# Patient Record
Sex: MALE | Employment: UNEMPLOYED | URBAN - METROPOLITAN AREA
[De-identification: names, ages, dates, MRNs, and addresses within clinical notes are randomized per-mention and may not be internally consistent; named-entity substitution may affect disease eponyms.]

---

## 2018-03-08 ENCOUNTER — HOSPITAL ENCOUNTER (EMERGENCY)
Facility: MEDICAL CENTER | Age: 43
End: 2018-03-08
Attending: EMERGENCY MEDICINE
Payer: COMMERCIAL

## 2018-03-08 VITALS
OXYGEN SATURATION: 98 % | TEMPERATURE: 98.4 F | HEART RATE: 84 BPM | DIASTOLIC BLOOD PRESSURE: 61 MMHG | BODY MASS INDEX: 27.49 KG/M2 | WEIGHT: 180.78 LBS | RESPIRATION RATE: 16 BRPM | SYSTOLIC BLOOD PRESSURE: 108 MMHG

## 2018-03-08 DIAGNOSIS — F22 PARANOID (HCC): ICD-10-CM

## 2018-03-08 DIAGNOSIS — F15.10 METHAMPHETAMINE ABUSE (HCC): ICD-10-CM

## 2018-03-08 DIAGNOSIS — F41.9 ANXIETY: ICD-10-CM

## 2018-03-08 LAB
AMPHET UR QL SCN: POSITIVE
BARBITURATES UR QL SCN: NEGATIVE
BENZODIAZ UR QL SCN: NEGATIVE
BZE UR QL SCN: NEGATIVE
CANNABINOIDS UR QL SCN: POSITIVE
METHADONE UR QL SCN: NEGATIVE
OPIATES UR QL SCN: NEGATIVE
OXYCODONE UR QL SCN: NEGATIVE
PCP UR QL SCN: NEGATIVE
POC BREATHALIZER: 0 PERCENT (ref 0–0.01)
PROPOXYPH UR QL SCN: NEGATIVE

## 2018-03-08 PROCEDURE — 96372 THER/PROPH/DIAG INJ SC/IM: CPT

## 2018-03-08 PROCEDURE — A9270 NON-COVERED ITEM OR SERVICE: HCPCS | Performed by: EMERGENCY MEDICINE

## 2018-03-08 PROCEDURE — 700111 HCHG RX REV CODE 636 W/ 250 OVERRIDE (IP): Performed by: EMERGENCY MEDICINE

## 2018-03-08 PROCEDURE — 99285 EMERGENCY DEPT VISIT HI MDM: CPT

## 2018-03-08 PROCEDURE — 80307 DRUG TEST PRSMV CHEM ANLYZR: CPT

## 2018-03-08 PROCEDURE — 302970 POC BREATHALIZER: Performed by: EMERGENCY MEDICINE

## 2018-03-08 PROCEDURE — 700102 HCHG RX REV CODE 250 W/ 637 OVERRIDE(OP): Performed by: EMERGENCY MEDICINE

## 2018-03-08 RX ORDER — LORAZEPAM 2 MG/ML
1 INJECTION INTRAMUSCULAR ONCE
Status: COMPLETED | OUTPATIENT
Start: 2018-03-08 | End: 2018-03-08

## 2018-03-08 RX ORDER — LORAZEPAM 1 MG/1
1 TABLET ORAL ONCE
Status: DISCONTINUED | OUTPATIENT
Start: 2018-03-08 | End: 2018-03-09 | Stop reason: HOSPADM

## 2018-03-08 RX ORDER — HALOPERIDOL 5 MG/ML
5 INJECTION INTRAMUSCULAR ONCE
Status: COMPLETED | OUTPATIENT
Start: 2018-03-08 | End: 2018-03-08

## 2018-03-08 RX ORDER — HALOPERIDOL 5 MG/1
5 TABLET ORAL ONCE
Status: COMPLETED | OUTPATIENT
Start: 2018-03-08 | End: 2018-03-08

## 2018-03-08 RX ADMIN — LORAZEPAM 1 MG: 2 INJECTION INTRAMUSCULAR; INTRAVENOUS at 12:00

## 2018-03-08 RX ADMIN — HALOPERIDOL 5 MG: 5 TABLET ORAL at 10:00

## 2018-03-08 RX ADMIN — HALOPERIDOL LACTATE 5 MG: 5 INJECTION, SOLUTION INTRAMUSCULAR at 12:00

## 2018-03-08 ASSESSMENT — PAIN SCALES - GENERAL
PAINLEVEL_OUTOF10: 0

## 2018-03-08 NOTE — DISCHARGE PLANNING
Alert team  Checked back in to see if patient appropriate for assessment.  Pt medicated and restrained; mumbling and refused to talk to me.  Will continue to monitor.

## 2018-03-08 NOTE — ED PROVIDER NOTES
"ED Provider Note    Scribed for Luciana Solares M.D. by Lucas Nance. 3/8/2018  7:59 AM    Primary care provider: Pcp Pt States None  Means of arrival: Walk-in  History obtained from: Patient  History limited by: EMS    CHIEF COMPLAINT  Chief Complaint   Patient presents with   • Anxiety     \"I have such bad anxiety right now.' Pt denies SI/HI. \"I used to take ativan, it helped a little but sometimes I come here and they give me a shot and put me out for a little and I wake up better.\"   • Hallucinations     \"These people have been following me around for two hours. There are people [place] the casinos that got the police to follow and stare at me.\"       HPI  Nasim Mckee is a 42 y.o. male who presents to the Emergency Department for hallucinations onset two hours ago with associated anxiety. Patient reports past IV drug use but says he now only uses methamphetamine via inhalation. He admits to feeling anxious and describes hearing voices outside of the room near the door. Patient has a prescription for Xanax from a previous physician to treat his anxiety and paranoia.   He denies suicidal ideation, homicidal ideation.    REVIEW OF SYSTEMS  Psychiatric: positive for hallucinations, anxiety. no suicidal ideation, homicidal ideation     See history of present illness. E.    PAST MEDICAL HISTORY   Anxiety    SURGICAL HISTORY  patient denies any surgical history    SOCIAL HISTORY  Social History   Substance Use Topics   • Smoking status: Current Every Day Smoker      Comment: 1/2 ppd since 2008   • Alcohol use No      History   Drug Use     Comment: meth IV, last use 11/18       FAMILY HISTORY  No pertinent family history.    CURRENT MEDICATIONS  Xanax    ALLERGIES  Allergies   Allergen Reactions   • Aspirin Swelling     Eyes swell       PHYSICAL EXAM  VITAL SIGNS: /91   Pulse 90   Temp 37.3 °C (99.2 °F) (Temporal)   Resp 18   Wt 82 kg (180 lb 12.4 oz)   SpO2 98%   BMI 27.49 kg/m²     Constitutional: Well " developed, Well nourished, No acute distress, Non-toxic appearance.   HEENT: Normocephalic, Atraumatic,  external ears normal, pharynx pink,  Mucous  Membranes moist, No rhinorrhea or mucosal edema. Tattoos on left side of face. No evidence of trauma.  Eyes: PERRL, EOMI, Conjunctiva normal, No discharge.   Neck: Normal range of motion, No tenderness, Supple, No stridor.   Lymphatic: No lymphadenopathy    Cardiovascular: Tachycardic, Regular Rhythm, No murmurs,  rubs, or gallops.   Thorax & Lungs: Lungs clear to auscultation bilaterally, No respiratory distress, No wheezes, rhales or rhonchi, No chest wall tenderness.   Abdomen: Bowel sounds normal, Soft, non tender, non distended,  No pulsatile masses., no rebound guarding or peritoneal signs.   Skin: Warm, Dry, No erythema, No rash,   Back:  No CVA tenderness,  No spinal tenderness, bony crepitance, step offs, or instability.   Neurologic: Alert & oriented x 3, Normal motor function, Normal sensory function, No focal deficits noted. Normal reflexes. Normal Cranial Nerves.  Psychiatric: Paranoid but not suicidal.   Extremities: Equal, intact distal pulses, No cyanosis, clubbing or edema,  No tenderness.   Musculoskeletal: Good range of motion in all major joints. No tenderness to palpation or major deformities noted. No evidence of trauma.    DIAGNOSTIC STUDIES / PROCEDURES    LABS  Results for orders placed or performed during the hospital encounter of 03/08/18   URINE DRUG SCREEN   Result Value Ref Range    Amphetamines Urine Positive (A) Negative    Barbiturates Negative Negative    Benzodiazepines Negative Negative    Cocaine Metabolite Negative Negative    Methadone Negative Negative    Opiates Negative Negative    Oxycodone Negative Negative    Phencyclidine -Pcp Negative Negative    Propoxyphene Negative Negative    Cannabinoid Metab Positive (A) Negative   POC BREATHALIZER   Result Value Ref Range    POC Breathalizer 0.001 0.00 - 0.01 Percent      All labs  reviewed by me.      COURSE & MEDICAL DECISION MAKING  Nursing notes, VS, PMSFHx reviewed in chart.    7:59 AM - Patient seen and examined at bedside. Patient will be treated with Xanax and [facility] was contacted to evaluate the patient. Ordered urine drug screen to evaluate his symptoms.    8:39 AM Per nurse, patient is becoming increasingly paranoid and aggressive. Patient will be treated with Haldol 5 mg IV and Ativan 1 mg.    9:56 AM Patient is still agitated and hallucinating. He will be placed on a legal hold at this time.    2:47 PM Patient will be re-checked by Dr. Portillo, ERP, after the effects of his drug use subside.         FINAL IMPRESSION  1. Methamphetamine abuse    2. Anxiety    3. Paranoid (CMS-HCC)          Lucas MO (Scribe), am scribing for, and in the presence of, Luciana Solares M.D..    Electronically signed by: Lucas Nance (Scribe), 3/8/2018    Luciana MO M.D. personally performed the services described in this documentation, as scribed by Lucas Nance in my presence, and it is both accurate and complete.    The note accurately reflects work and decisions made by me.  Luciana Solares  3/8/2018  3:50 PM

## 2018-03-08 NOTE — DISCHARGE PLANNING
"Alert team  Attempted to assess pt, but he is too paranoid and refused to divulge any information.  He grew increasingly agitated and hostile the more I attempted to assess him.  Pt believes the staff is plotting against him, that his phone line and internet, (he still has his phone and tablet,) are being tapped and disturbed.  He has been calling his grandma, telling her to send him a .  He demanded to speak to my manager, saying I was harassing him for trying to assess him.  I offered to help him discharge; he didn't want to go. \"I'm sick! I can't leave. My head hurts.  My chest hurts, just get out of here!\"  I asked him what we could do to help him, he responded that ativan usually helps.  But then he refused my offer to try and get that, saying he didn't trust me and I wasn't a real nurse.  He believes I am part of some conspiracy.  Pt UDS positive for amphetamines and cannabis.  Will attempt to assess further when mentation clears.  "

## 2018-03-08 NOTE — ED NOTES
"Pt ambulated to bathroom, began hallucinating, refused to go back to room, yelling at staff & sitting on floor in hallway.    Security called, pt taken back to room & 4 pt restraints applied.  Pt medicated as ordered.  Will continue to monitor.

## 2018-03-08 NOTE — ED NOTES
Legal 2000 initiated for inability to care for self.  Pt takes Haldol, but refuses Ativan at this time.    With security assistance, pt changes into gown.    All belongings removed from room, labeled & placed in designated area.

## 2018-03-08 NOTE — ED TRIAGE NOTES
"Nasim Mckee  42 y.o. male  Chief Complaint   Patient presents with   • Anxiety     \"I have such bad anxiety right now.' Pt denies SI/HI. \"I used to take ativan, it helped a little but sometimes I come here and they give me a shot and put me out for a little and I wake up better.\"   • Hallucinations     \"These people have been following me around for two hours. There are people in the casinos that got the police to follow and stare at me.\"       Pt amb to triage with steady gait for above complaint. BIB EMS.     Pt is alert and oriented X4, speaking in full sentences, follows commands. Pt visibly anxious. Resp are even and unlabored. Pt admits to meth use, last used yesterday.     Pt placed in lobby. Pt educated on triage process. Pt encouraged to alert staff for any changes.    "

## 2018-03-08 NOTE — ED NOTES
Pt agrees to take pills, but then when RN enters room, pt again refuses.   Security called to bedside.

## 2018-03-08 NOTE — ED NOTES
2 restraints removed, pt states he does not want all 4 off at this time.  Will continue to monitor.  Calm & cooperative at this time.

## 2018-03-09 NOTE — DISCHARGE INSTRUCTIONS
Please follow-up with your primary care provider for blood pressure management.  Drug Abuse, Frequently Asked Questions  Drug addiction is a complex brain disease. It is characterized by compulsive, at times uncontrollable, drug craving, seeking, and use that persists even in the face of extremely negative results. Drug seeking becomes compulsive, in large part as a result of the effects of prolonged drug use on brain functioning and, thus, on behavior. For many people, drug addiction becomes chronic, with relapses possible even after long periods of being off the drug.  HOW QUICKLY CAN I BECOME ADDICTED TO A DRUG?  There is no easy answer to this. If and how quickly you might become addicted to a drug depends on many factors including the biology of your body. All drugs are potentially harmful and may have life-threatening consequences associated with their use. There are also vast differences among individuals in sensitivity to various drugs. While one person may use a drug many times and suffer no ill effects, another person may be particularly vulnerable and overdose or developing a craving with the first use. There is no way of knowing in advance how someone may react.  HOW DO I KNOW IF SOMEONE IS ADDICTED TO DRUGS?  If a person is compulsively seeking and using a drug despite negative consequences (such as loss of job, debt, physical problems brought on by drug abuse, or family problems) then he or she is probably addicted. Those who screen for drug problems, such as physicians, have developed the CAGE questionnaire. These four simple questions can help detect substance abuse problems:  · Have you ever felt you ought to Cut down on your drinking/drug use?   · Have people ever Annoyed you by criticizing your drinking/drug use?   · Have you ever felt bad or Guilty about your drinking/drug use?   · Have you ever had a drink or taken a drug first thing in the morning to steady your nerves or get rid of a hangover  "(Eye-opener)?   WHAT ARE THE PHYSICAL SIGNS OF ABUSE OR ADDICTION?  The physical signs of abuse or addiction can vary depending on the person and the drug being abused. For example, someone who abuses marijuana may have a chronic cough or worsening of asthmatic conditions. THC, the chemical in marijuana responsible for producing its effects, is associated with weakening the immune system which makes the user more vulnerable to infections, such as pneumonia. Each drug has short-term and long-term physical effects. Stimulants like cocaine increase heart rate and blood pressure, whereas opioids like heroin may slow the heart rate and reduce breathing (respiration).   ARE THERE EFFECTIVE TREATMENTS FOR DRUG ADDICTION?  Drug addiction can be effectively treated with behavioral-based therapies and, for addiction to some drugs such as heroin or nicotine, medications may be used. Treatment may vary for each person depending on the type of drug(s) being used and multiple courses of treatment may be needed to achieve success. Research has revealed 13 basic principles that underlie effective drug addiction treatment. These are discussed in [employer]'s Principles of Drug Addiction Treatment: A Research-Based Guide.  WHERE CAN I FIND INFORMATION ABOUT DRUG TREATMENT PROGRAMS?  · For referrals to treatment programs, visit the Substance Abuse and Mental Health Services Administration online at http://findtreatment.samhsa.gov/.   · [employer] publishes an expanding series of treatment manuals, the \"clinical toolbox,\" that gives drug treatment providers research-based information for creating effective treatment programs.   WHAT IS DETOXIFICATION, OR \"DETOX\"?  Detoxification is the process of allowing the body to rid itself of a drug while managing the symptoms of withdrawal. It is often the first step in a drug treatment program and should be followed by treatment with a behavioral-based therapy and/or a medication, if available. Detox alone " with no follow-up is not treatment.   WHAT IS WITHDRAWAL? HOW LONG DOES IT LAST?  Withdrawal is the variety of symptoms that occur after use of some addictive drugs is reduced or stopped. Length of withdrawal and symptoms vary with the type of drug. For example, physical symptoms of heroin withdrawal may include restlessness, muscle and bone pain, insomnia, diarrhea, vomiting, and cold flashes. These physical symptoms may last for several days, but the general depression, or dysphoria (opposite of euphoria), that often accompanies heroin withdrawal, may last for weeks. In many cases withdrawal can be easily treated with medications to ease the symptoms. But treating withdrawal is not the same as treating addiction.   WHAT ARE THE COSTS OF DRUG ABUSE TO SOCIETY?  Beyond the raw numbers are other costs to society:  · Spread of infectious diseases such as HIV/AIDS and hepatitis C either through sharing of drug paraphernalia or unprotected sex.   · Deaths due to overdose or other complications from drug use.   · Effects on unborn children of pregnant drug users.   · Other effects such as crime and homelessness.   IF A PREGNANT WOMAN ABUSES DRUGS, DOES IT AFFECT THE FETUS?  · Many substances including alcohol, nicotine, and drugs of abuse can have negative effects on the developing fetus because they are transferred to the fetus across the placenta. For example, nicotine has been connected with premature birth and low birth weight, as has the use of cocaine. Scientific studies have shown that babies born to marijuana users were shorter, weighed less, and had smaller head sizes than those born to mothers who did not use the drug. Smaller babies are more likely to develop health problems.   · Whether a baby's health problems, if caused by a drug, will continue as the child grows, is not always known. Research does show that children born to mothers who used marijuana regularly during pregnancy may have trouble  concentrating, when older. Our research continues to produce insights on the negative effects of drug use on the fetus.   Document Released: 12/20/2004 Document Revised: 03/11/2013 Document Reviewed: 03/19/2010  [employer]® Patient Information ©2013 [employer], [employer].

## 2018-03-09 NOTE — ED NOTES
"Med rec complete per pt at bedside  Pt denies taking any medications currently  States he takes some \"sometimes\" but does not know what they are or what pharmacy he got them from  Pt can only confirm that it has been \"weeks\" since he took anything   Allergies reviewed  No ABX in last month  "

## 2018-03-09 NOTE — ED NOTES
Per ERP, pt started to elope, ERP was able to catch him and talk with him.  Pt's legal hold was lifted by ERP.  Pt refused to get his belongings and left the ED.  ED supervisor and charge RN aware.

## 2018-03-09 NOTE — ED PROVIDER NOTES
Patient was turned over to me by Dr. Solares.     10:43 PM - Patient was reevaluated at bedside. He is more alert and awake at this time, but still paranoid. Will have the patient be reevaluated further by another provider.     1100 p.m. the patient was reevaluated by the alert team, the patient is awake and alert, not paranoid anymore, is able to make medical decisions, and did not believe the patient is a danger to himself or others, all discharge the patient home, take the patient off the legal hold. The patient was upset about this, I discussed this with him, he has eloped prior to getting discharge instructions    1. Methamphetamine abuse    2. Anxiety    3. Paranoid (CMS-HCC)

## 2018-03-09 NOTE — ED NOTES
Vitals taken.  Pt awakens & answers questions.  Calm & cooperative at this time.  Will continue to monitor.

## 2018-03-09 NOTE — DISCHARGE PLANNING
"Alert Team Note: Patient seen lying on hospital bed resting, but easy to engage. Patient denies suicidal or homicidal ideations. Patient states, \"I just use meth\". Patient denies auditory or visual hallucinations, however reports experiencing anxiety when using drugs.  Patient states he is from [place] and has been in [place] for just a few weeks. Patient states he wants a , however advised him that this request was beyond the jurisdiction of the hospital.  Patient reports being hungry, gave patient box lunch and informed bedside RN and Emergency Room physician, based on patients report, he no longer meets criteria for psychiatric hold.    Isabell Stoddard, Ph.D.  Alert Team Therapist  "

## 2023-09-12 ENCOUNTER — HOSPITAL ENCOUNTER (EMERGENCY)
Dept: HOSPITAL 95 - ER | Age: 48
LOS: 1 days | Discharge: HOME | End: 2023-09-13
Payer: COMMERCIAL

## 2023-09-12 VITALS — BODY MASS INDEX: 25.76 KG/M2 | WEIGHT: 170 LBS | HEIGHT: 68 IN

## 2023-09-12 VITALS — DIASTOLIC BLOOD PRESSURE: 72 MMHG | SYSTOLIC BLOOD PRESSURE: 111 MMHG

## 2023-09-12 DIAGNOSIS — L25.9: Primary | ICD-10-CM

## 2023-09-12 PROCEDURE — A9270 NON-COVERED ITEM OR SERVICE: HCPCS
